# Patient Record
Sex: MALE | Race: WHITE
[De-identification: names, ages, dates, MRNs, and addresses within clinical notes are randomized per-mention and may not be internally consistent; named-entity substitution may affect disease eponyms.]

---

## 2020-03-13 ENCOUNTER — HOSPITAL ENCOUNTER (EMERGENCY)
Dept: HOSPITAL 76 - ED | Age: 40
Discharge: HOME | End: 2020-03-13
Payer: SELF-PAY

## 2020-03-13 VITALS — SYSTOLIC BLOOD PRESSURE: 142 MMHG | DIASTOLIC BLOOD PRESSURE: 82 MMHG

## 2020-03-13 DIAGNOSIS — T84.126A: Primary | ICD-10-CM

## 2020-03-13 DIAGNOSIS — Z87.81: ICD-10-CM

## 2020-03-13 DIAGNOSIS — M25.571: ICD-10-CM
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
## 2020-03-13 PROCEDURE — 99283 EMERGENCY DEPT VISIT LOW MDM: CPT

## 2020-03-13 NOTE — XRAY REPORT
Reason:  check for surgical screw placement.

Procedure Date:  03/13/2020   

Accession Number:  [id number] / [id number]                    

Procedure:  XR  - Ankle 3 View RT CPT Code:  

 

***Final Report***

 

 

FULL RESULT:

 

 

EXAM:

RIGHT ANKLE RADIOGRAPHY

 

EXAM DATE: 3/13/2020 09:58 AM.

 

CLINICAL HISTORY: Check for surgical screw placement.

 

COMPARISON: ANKLE 3 VIEW RT 11/14/2018 8:00 AM.

 

TECHNIQUE: 3 views.

 

FINDINGS:

Bones: Again seen is a lateral plate and screw construct securing a 

previously healed distal fibular fracture. As part of the construct, a 

single trans-syndesmotic screw which was noted to be fractured in 2018 

has now laterally retracted causing a visual soft tissue protuberance 

above the lateral malleolus.

 

Joints: The ankle mortise appears symmetric. No dislocation is seen.

 

Soft Tissues: Soft tissue deformity due to the laterally translated screw.

IMPRESSION: Interval lateral displacement of proximal screw fragment of 

the previously fractured trans-syndesmotic screw.

 

RADIA

## 2020-03-13 NOTE — ED PHYSICIAN DOCUMENTATION
PD HPI LOWER EXT INJURY





- Stated complaint


Stated Complaint: RT ANKLE INJ





- Chief complaint


Chief Complaint: Ext Problem





- History obtained from


History obtained from: Patient





- History of Present Illness


PD HPI LOW EXT INJURY LOCATION: Right, Ankle


Type of injury: Twist (did not fall nor significant impact, but just twisted 

ankle and felt abrupt pain and noted lump under skin in area of prior fracture 

repair (done here on [place] in 2018).)


Timing - onset: Today


Timing - details: Abrupt onset, Still present


Worsened by: Moving, Palpating


Associated symptoms: Other (firm lump pushing out skin lateral ankle.).  No: 

Weakness, Numbness


Similar symptoms before: Has not had sx before





Review of Systems


Neurologic: denies: Focal weakness, Numbness





PD PAST MEDICAL HISTORY





- Past Medical History


Cardiovascular: None


Respiratory: None


Neuro: None


Endocrine/Autoimmune: None


GI: None


: None


HEENT: None


Psych: None


Musculoskeletal: None


Derm: None





- Past Surgical History


Past Surgical History: No





- Present Medications


Home Medications: 


                                Ambulatory Orders











 Medication  Instructions  Recorded  Confirmed


 


HYDROcod/ACETAM 5/325 [Norco 5/325] 1 tab PO Q6H PRN #25 tablet 07/09/18 07/17/18


 


Naproxen [Naprosyn] 500 mg PO BID PRN #30 tablet 07/09/18 07/17/18


 


Naproxen 500 mg PO BID #20 tablet 03/13/20 














- Allergies


Allergies/Adverse Reactions: 


                                    Allergies











Allergy/AdvReac Type Severity Reaction Status Date / Time


 


No Known Drug Allergies Allergy   Verified 03/13/20 09:44














- Social History


Does the pt smoke?: No


Smoking Status: Never smoker


Does the pt drink ETOH?: Yes


Does the pt have substance abuse?: No





- POLST


Patient has POLST: No





PD ED PE NORMAL





- Vitals


Vital signs reviewed: Yes





- General


General: Alert and oriented X 3, No acute distress, Well developed/nourished





- Derm


Derm: Normal color, Warm and dry





- Extremities


Extremities: Other (right lateral ankle with healed surgical scar. Lower aspect 

has firm lump under skin and tenting it some, c/w screw head. No redness of the 

skin. No effusion of ankle. )





- Neuro


Neuro: No motor deficit, No sensory deficit





Results





- Vitals


Vitals: 


                                     Oxygen











O2 Source                      Room air

















- Rads (name of study)


  ** right ankle xray


Radiology: Prelim report reviewed (prior fracture repair plate and screws. 

Previously seen broken screw, with the outer part now looking acutely loosened 

and laterally displaced.), See rad report





PD MEDICAL DECISION MAKING





- ED course


Complexity details: reviewed results (displaced lateral part of previously 

broken ortho screw. ), considered differential (apparent loosening and partial 

dislodgement of prior ortho screw. It is pushing out just under skin and tenting

 it. We do not have Ortho for couple of weeks, so I contacted Ortho in 

[facility], and Dr. Almanzar is happy to see pt early next week (it is 

currently Friday afternoon) and can remove screw in the office. ), d/w patient





Departure





- Departure


Disposition: 01 Home, Self Care


Clinical Impression: 


 Loosening of orthopedic screw





Acute ankle pain


Qualifiers:


 Laterality: right Qualified Code(s): M25.571 - Pain in right ankle and joints 

of right foot





Condition: Stable


Record reviewed to determine appropriate education?: Yes


Follow-Up: 


FAMILIA ALMANZAR [Physician No Access] - 


Prescriptions: 


Naproxen 500 mg PO BID #20 tablet


Comments: 


I talked with Dr. Ponce who is orthopedics at [facility] as well as [place].  He can

see you in follow-up and will do it presumably early next week.  Call today at 

[phone] to set him an appointment and let him know that we have talked with 

Dr. Ponce about following up soon.


Discharge Date/Time: 03/13/20 12:01

## 2020-03-24 ENCOUNTER — HOSPITAL ENCOUNTER (OUTPATIENT)
Dept: HOSPITAL 76 - SDS | Age: 40
Discharge: HOME | End: 2020-03-24
Attending: ORTHOPAEDIC SURGERY
Payer: COMMERCIAL

## 2020-03-24 VITALS — DIASTOLIC BLOOD PRESSURE: 92 MMHG | SYSTOLIC BLOOD PRESSURE: 120 MMHG

## 2020-03-24 DIAGNOSIS — T84.116A: Primary | ICD-10-CM
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
## 2020-03-24 PROCEDURE — 20680 REMOVAL OF IMPLANT DEEP: CPT

## 2020-03-24 PROCEDURE — 0QPL04Z REMOVAL OF INTERNAL FIXATION DEVICE FROM RIGHT TARSAL, OPEN APPROACH: ICD-10-PCS | Performed by: ORTHOPAEDIC SURGERY

## 2020-03-24 NOTE — ANESTHESIA
Pre-Anesthesia VS, & Labs





- Diagnosis





failed hardware s/p ORIF R ankle





- Procedure





hardware removal @R ankle ( 1 screw)





                                        





Height                           5 ft 7 in


Weight (kg)                      81.65 kg


Body Mass Index                  29.0











- NPO


>8 hours





Home Medications and Allergies


Allergies/Adverse Reactions: 


                                    Allergies











Allergy/AdvReac Type Severity Reaction Status Date / Time


 


No Known Drug Allergies Allergy   Verified 03/13/20 09:44














Anes History & Medical History





- Anesthetic History


Anesthesia Complications: reports: No previous complications


Family history of Anesthesia Complications: Denies


Family history of Malignant Hyperthermia: Denies





- Medical History


Cardiovascular: reports: None


Pulmonary: reports: None


Gastrointestinal: reports: None


Urinary: reports: None


Neuro: reports: None


Musculoskeletal: reports: None


Endocrine/Autoimmune: reports: None


Skin: reports: None


Smoking Status: Never smoker


Psychosocial: reports: Alcohol (social)





- Surgical History


Orthopedic: Other (ORIF R ankle)





Exam


General: Alert, Oriented x3, Cooperative


Dental: WNL


Neck Mobility: Normal


Mallampati classification: II


Thyromental Distance: greater than 6 cm


Respiratory: Lungs clear, Normal breath sounds, No respiratory distress


Cardiovascular: Regular rate


Neurological: Normal speech


Mental/Cognitive Status: Alert/Oriented X3, Normal for patient


Cognitive Status: Within normal limits





Plan


Anesthesia Type: General (backup, will discuss with MD), MAC


Consent for Procedure(s) Verified and Reviewed: Yes


Code Status: Attempt Resuscitation


ASA classification: 2-Mild systemic disease


Is this case an emergency?: No

## 2020-03-24 NOTE — OPERATIVE REPORT
DATE OF SERVICE: 03/24/2020

Physician: Stephon Dorman MD

 

PREOPERATIVE DIAGNOSIS:  Right ankle broken syndesmotic screw hardware with impending open wound.

 

POSTOPERATIVE DIAGNOSIS:  Right ankle broken syndesmotic screw hardware with impending open wound.

 

PROCEDURE:  Right ankle, partial removal of syndesmotic screw hardware.

 

INTRAOPERATIVE COMPLICATIONS:  None noted.

 

ESTIMATED BLOOD LOSS:  Less than 5 mL

 

PREOPERATIVE ANTIBIOTICS:  Two grams of weight-based IV Ancef.

 

ANESTHESIA:  10 mL of 1% plain Marcaine local anesthesia, as well as sedation per anesthesia team.

 

ANESTHESIA PROVIDER:  Giovanni Harman CRNA

 

COMPRESSION DEVICE:  Contralateral left calf SCD boots.

 

HISTORY OF PRESENT ILLNESS AND INDICATIONS:  Patient is a 39-year-old gentleman with a painful impend
ing prominent syndesmotic screw in his right ankle.  He is generally doing quite well, aside from a s
mall area where the hardware is pressing on his skin, compromising skin and threatening an open injur
y.  We had previously discussed risks, benefits, and alternatives of operative and nonoperative treat
ment.  We talked about the community health risks and timing of the procedure, and as indicated patie
nt for operative intervention as this is not an elective situation.  He verbalized understanding of t
he risks, benefits and alternatives.  His questions are answered.  He verbalized his wish to proceed 
with operative treatment.  Informed consent is given.

 

DESCRIPTION OF PROCEDURE:  After informed consent is given, patient's right ankle is identified as th
e operative site.  He is given weight-based IV antibiotics, brought to the operating room.  He is giv
en sedation per anesthesia team.  He is placed supine in a safe position to avoid head, neck and extr
emity injury.  Patient's right lower extremity previously shaved around the ankle.  He then has pre-c
leaning with Hibiclens solution, followed by alcohol, followed by prep and draped using ChloraPrep so
lution.  Surgical pause identifies right ankle as the operative site.  The prominent syndesmotic scre
w is easily identified just posterior to the previous surgical incision.  At this point, a small inci
ava is made through the corresponding area of the previous surgical scar, just through skin and then
 spreading dissection carried out while the skin is pulled posteriorly, thereby revealing the screw h
ead.  This is twisted out of the bone and removed completely.  The bone is curetted using a small cur
ette.  The wound is copiously irrigated.  Hemostasis is achieved.  Again, copious irrigation is perfo
rmed, and then the skin is closed using interrupted nylon suture for skin after a single subcutaneous
 Vicryl suture.  Care is taken to perform spreading dissection only to avoid cutting beneath the leve
l of skin to avoid iatrogenic injury.  At this point, the skin is washed and dried.  It should be not
ed that prior to the incision, a wheal of local anesthesia is made across the leg proximal to the teresa
nned incision site with the local anesthetic noted above.

 

At this point, Xeroform dressing is applied.  Dry sterile dressing and then Ace wrap.

 

Patient tolerated the procedure well.  Instrument and sponge counts are correct.  Patient is transfer
red to the recovery room in stable condition.

 

Patient will be weightbearing as tolerated.  He will avoid exertion.  He will ice and elevate at rest
.  He will use perioperative antibiotics for 24 hours, pain medication, narcotics prescribed if neces
salina.  He is advised on the safe way to use this.  Denies any contraindication to medications planned
, will use them as directed for short course only, and follow up in 10-14 days or sooner should probl
ems or questions arise.

 

 

DD: 03/24/2020 11:20

TD: 03/24/2020 11:22

Job #: [id number]

## 2020-03-24 NOTE — IMMEDIATE POSTOPERATIVE NOTE
Immediate Postoperative Note





- Procedure Note


Procedure Date: 03/24/20


Pre-Op Diagnosis: Impending open broken hardware right ankle, Syndesmotic screw


Procedure: 


Right ankle partial removal of hardware, Partial syndesmotic screw





Post-Op Diagnosis: Same


Assistant: None


Anesthesia Type: Local, Moderate sedation


Complications: No complications


Estimated Blood Loss (in cc): 5


Drains, Catheters, Devices: 





NA


Specimens and Cultures: 





NA


Plan of Care: 


Patient tolerated procedure well instrument and sponge counts correct patient 

transferred recovery room in stable condition





Patient will keep dressing clean dry and intact 10 to 14 days follow-up 

orthopedic clinic for suture removal he will notify us sooner should problems or

questions arise.  Advised avoid exertion in the meantime he may ice and elevate 

at rest.





Recommend perioperative antibiotics 24 hours pain medications as necessary